# Patient Record
Sex: MALE | Race: WHITE | NOT HISPANIC OR LATINO | Employment: FULL TIME | URBAN - METROPOLITAN AREA
[De-identification: names, ages, dates, MRNs, and addresses within clinical notes are randomized per-mention and may not be internally consistent; named-entity substitution may affect disease eponyms.]

---

## 2022-10-27 ENCOUNTER — HOSPITAL ENCOUNTER (EMERGENCY)
Facility: CLINIC | Age: 57
Discharge: HOME | End: 2022-10-27
Attending: EMERGENCY MEDICINE | Admitting: EMERGENCY MEDICINE
Payer: COMMERCIAL

## 2022-10-27 VITALS
RESPIRATION RATE: 14 BRPM | DIASTOLIC BLOOD PRESSURE: 96 MMHG | SYSTOLIC BLOOD PRESSURE: 146 MMHG | HEART RATE: 72 BPM | TEMPERATURE: 98.6 F | OXYGEN SATURATION: 99 %

## 2022-10-27 DIAGNOSIS — I10 HYPERTENSION, UNSPECIFIED TYPE: ICD-10-CM

## 2022-10-27 LAB
ANION GAP SERPL CALCULATED.3IONS-SCNC: 4 MMOL/L (ref 3–14)
ATRIAL RATE - MUSE: 60 BPM
BASOPHILS # BLD AUTO: 0 10E3/UL (ref 0–0.2)
BASOPHILS NFR BLD AUTO: 1 %
BUN SERPL-MCNC: 16 MG/DL (ref 7–30)
CALCIUM SERPL-MCNC: 9.1 MG/DL (ref 8.5–10.1)
CHLORIDE BLD-SCNC: 103 MMOL/L (ref 94–109)
CO2 SERPL-SCNC: 30 MMOL/L (ref 20–32)
CREAT SERPL-MCNC: 0.98 MG/DL (ref 0.66–1.25)
DIASTOLIC BLOOD PRESSURE - MUSE: NORMAL MMHG
EOSINOPHIL # BLD AUTO: 0.2 10E3/UL (ref 0–0.7)
EOSINOPHIL NFR BLD AUTO: 3 %
ERYTHROCYTE [DISTWIDTH] IN BLOOD BY AUTOMATED COUNT: 12 % (ref 10–15)
GFR SERPL CREATININE-BSD FRML MDRD: >90 ML/MIN/1.73M2
GLUCOSE BLD-MCNC: 110 MG/DL (ref 70–99)
HCT VFR BLD AUTO: 44.6 % (ref 40–53)
HGB BLD-MCNC: 15.4 G/DL (ref 13.3–17.7)
IMM GRANULOCYTES # BLD: 0 10E3/UL
IMM GRANULOCYTES NFR BLD: 0 %
INTERPRETATION ECG - MUSE: NORMAL
LYMPHOCYTES # BLD AUTO: 2.7 10E3/UL (ref 0.8–5.3)
LYMPHOCYTES NFR BLD AUTO: 43 %
MCH RBC QN AUTO: 32.4 PG (ref 26.5–33)
MCHC RBC AUTO-ENTMCNC: 34.5 G/DL (ref 31.5–36.5)
MCV RBC AUTO: 94 FL (ref 78–100)
MONOCYTES # BLD AUTO: 0.4 10E3/UL (ref 0–1.3)
MONOCYTES NFR BLD AUTO: 6 %
NEUTROPHILS # BLD AUTO: 3 10E3/UL (ref 1.6–8.3)
NEUTROPHILS NFR BLD AUTO: 47 %
NRBC # BLD AUTO: 0 10E3/UL
NRBC BLD AUTO-RTO: 0 /100
P AXIS - MUSE: 72 DEGREES
PLATELET # BLD AUTO: 215 10E3/UL (ref 150–450)
POTASSIUM BLD-SCNC: 3.5 MMOL/L (ref 3.4–5.3)
PR INTERVAL - MUSE: 166 MS
QRS DURATION - MUSE: 100 MS
QT - MUSE: 402 MS
QTC - MUSE: 402 MS
R AXIS - MUSE: 59 DEGREES
RBC # BLD AUTO: 4.75 10E6/UL (ref 4.4–5.9)
SODIUM SERPL-SCNC: 137 MMOL/L (ref 133–144)
SYSTOLIC BLOOD PRESSURE - MUSE: NORMAL MMHG
T AXIS - MUSE: 27 DEGREES
VENTRICULAR RATE- MUSE: 60 BPM
WBC # BLD AUTO: 6.3 10E3/UL (ref 4–11)

## 2022-10-27 PROCEDURE — 99284 EMERGENCY DEPT VISIT MOD MDM: CPT

## 2022-10-27 PROCEDURE — 82310 ASSAY OF CALCIUM: CPT | Performed by: EMERGENCY MEDICINE

## 2022-10-27 PROCEDURE — 93005 ELECTROCARDIOGRAM TRACING: CPT

## 2022-10-27 PROCEDURE — 36415 COLL VENOUS BLD VENIPUNCTURE: CPT | Performed by: EMERGENCY MEDICINE

## 2022-10-27 PROCEDURE — 85025 COMPLETE CBC W/AUTO DIFF WBC: CPT | Performed by: EMERGENCY MEDICINE

## 2022-10-27 ASSESSMENT — ENCOUNTER SYMPTOMS: SHORTNESS OF BREATH: 0

## 2022-10-27 NOTE — ED TRIAGE NOTES
Pt has been checking his bp at home and it has been 150/110's and 180/126, recently started losartan potassium 2 weeks ago

## 2022-10-27 NOTE — ED PROVIDER NOTES
History   Chief Complaint:  Hypertension       HPI   Colton Willis is a 57 year old male who presents with concern for Hypertension. The patient says that usually 140s/80s, was started on losartan about a week and a half ago and has been seeing higher pressures in the 160s/110s sometimes. He denies chest pain, shortness of breath, or other symptoms. He says that he called a nurse line and they recommended that he presents to the ED. he denies chest pain.  He is got a chronic headache that he attributes to his sleep apnea.  He denies any shortness of breath.  There are no further aggravating or alleviating factors no further associated symptoms.    Review of Systems   Respiratory: Negative for shortness of breath.    Cardiovascular: Negative for chest pain.   All other systems reviewed and are negative.    Allergies:  The patient has no known allergies.       Medications:  Losartan      Past Medical History:     Hypertension  Sleep apnea    Past Surgical History:    Noncontributory    Family History:    No significant history of early onset diseases    Social History:  Denies tobacco alcohol drugs of abuse    Physical Exam     Patient Vitals for the past 24 hrs:   BP Temp Pulse Resp SpO2   10/27/22 1755 (!) 146/96 98.6  F (37  C) 72 14 99 %       Physical Exam  General: Alert, interactive   Head:  Scalp is atraumatic  Eyes:  The pupils are equal, round, and reactive to light    EOM's intact    No scleral icterus  ENT:      Nose:  The external nose is normal  Ears:  External ears are normal     Neck:  Normal range of motion.      There is no rigidity.    Trachea is in the midline         CV:  Regular rate and rhythm    No murmur   Resp:  Breath sounds are clear bilaterally    Non-labored, no retractions or accessory muscle use      GI:  Abdomen is soft, no distension, no tenderness.       MS:  Normal strength in all 4 extremities  Skin:  Warm and dry, No rash or lesions noted.  Neuro: Strength 5/5 x4.  Sensation  intact  In all 4 extremities.        Psych:  Awake. Alert.  Normal affect.      Appropriate interactions.      Emergency Department Course   ECG  ECG results from 08/01/12   EKG 12-lead, tracing only     Value    Ventricular Rate 62    Atrial Rate 62    HI Interval 150    QRS Duration 92        QTc 408    P Axis 4    R AXIS 30    T Axis 8    Interpretation ECG Sinus rhythm    Interpretation ECG Normal ECG    Interpretation ECG No previous ECGs available     Laboratory:  Labs Ordered and Resulted from Time of ED Arrival to Time of ED Departure   BASIC METABOLIC PANEL - Abnormal       Result Value    Sodium 137      Potassium 3.5      Chloride 103      Carbon Dioxide (CO2) 30      Anion Gap 4      Urea Nitrogen 16      Creatinine 0.98      Calcium 9.1      Glucose 110 (*)     GFR Estimate >90     CBC WITH PLATELETS AND DIFFERENTIAL    WBC Count 6.3      RBC Count 4.75      Hemoglobin 15.4      Hematocrit 44.6      MCV 94      MCH 32.4      MCHC 34.5      RDW 12.0      Platelet Count 215      % Neutrophils 47      % Lymphocytes 43      % Monocytes 6      % Eosinophils 3      % Basophils 1      % Immature Granulocytes 0      NRBCs per 100 WBC 0      Absolute Neutrophils 3.0      Absolute Lymphocytes 2.7      Absolute Monocytes 0.4      Absolute Eosinophils 0.2      Absolute Basophils 0.0      Absolute Immature Granulocytes 0.0      Absolute NRBCs 0.0            Emergency Department Course:   Reviewed:  I reviewed nursing notes and vitals    Assessments:   I obtained history and examined the patient as noted above.    I rechecked the patient and explained findings.       Disposition:  The patient was discharged to home.     Impression & Plan   Medical Decision Making:  Following presentation history and physical examination were performed, the above work-up was undertaken.  Findings are consistent with essential hypertension.  There is no signs of endorgan damage.  No chest pain to suggest acute coronary syndrome,  pulmonary embolism, aortic dissection.  There is no signs of acute intracranial hemorrhage.  Laboratory work-up is reassuring.  I recommended close follow-up with his primary care provider as he was just initiated on his losartan approximately 10 days ago.  He may require an increasing dose.  Patient was in agreement this plan subsequently discharged home.    Diagnosis:    ICD-10-CM    1. Hypertension, unspecified type  I10         Scribe Disclosure:  I, Angel Berg, am serving as a scribe at 6:03 PM on 10/27/2022 to document services personally performed by Sonny Schmidt,* based on my observations and the provider's statements to me.              Sonny Schmidt MD  10/27/22 1944

## 2022-10-27 NOTE — ED NOTES
RN Across the Room Assessment in Triage      Means of arrival:     Airway: Alert in the AVPU scale    Breathing: Spontaneous breathing    Circulation: No external bleeding    Disability: Ambulatory      Across the Room Intervention: In line for triage    Pt reports multiple high blood pressure readings since this morning. Pt states no new headache or dizziness